# Patient Record
Sex: MALE | Race: WHITE
[De-identification: names, ages, dates, MRNs, and addresses within clinical notes are randomized per-mention and may not be internally consistent; named-entity substitution may affect disease eponyms.]

---

## 2018-01-10 ENCOUNTER — HOSPITAL ENCOUNTER (OUTPATIENT)
Dept: HOSPITAL 45 - C.RDSM | Age: 63
Discharge: HOME | End: 2018-01-10
Attending: ORTHOPAEDIC SURGERY
Payer: COMMERCIAL

## 2018-01-10 DIAGNOSIS — R52: Primary | ICD-10-CM

## 2018-02-22 ENCOUNTER — HOSPITAL ENCOUNTER (OUTPATIENT)
Dept: HOSPITAL 45 - C.ULTR | Age: 63
Discharge: HOME | End: 2018-02-22
Attending: HOSPITALIST
Payer: COMMERCIAL

## 2018-02-22 DIAGNOSIS — R10.9: Primary | ICD-10-CM

## 2018-02-22 NOTE — DIAGNOSTIC IMAGING REPORT
ABDOMEN COMPLETE (US)



CLINICAL HISTORY: Generalized abdominal pain    



COMPARISON STUDY:  No previous studies for comparison.



FINDINGS: The liver appears sonographically normal. The gallbladder appears

sonographically normal. No pancreatic masses are visualized. The spleen measures

10 cm in length. The left kidney measures 10.9 cm in length. The left kidney

measures 9.7 cm in length. There is a prominent, particularly on the left. There

is no hydronephrosis. There is no evidence of abdominal aortic dilatation. No

abnormality IVC are visualized. The common hepatic duct measuring 8 mm. The

common bile duct measured 4 mm.



IMPRESSION:  

1. Ultrasonographically normal liver gallbladder pancreas and spleen

2. No evidence of hydronephrosis

3. No significant ductal dilatation









Electronically signed by:  Niles Duarte M.D.

2/22/2018 9:01 AM



Dictated Date/Time:  2/22/2018 8:58 AM